# Patient Record
Sex: FEMALE | Race: WHITE
[De-identification: names, ages, dates, MRNs, and addresses within clinical notes are randomized per-mention and may not be internally consistent; named-entity substitution may affect disease eponyms.]

---

## 2017-04-17 ENCOUNTER — HOSPITAL ENCOUNTER (OUTPATIENT)
Dept: HOSPITAL 80 - BMCIMAGING | Age: 47
End: 2017-04-17
Attending: OBSTETRICS & GYNECOLOGY
Payer: COMMERCIAL

## 2017-04-17 DIAGNOSIS — Z12.31: Primary | ICD-10-CM

## 2017-04-17 DIAGNOSIS — D25.2: ICD-10-CM

## 2017-04-17 DIAGNOSIS — Z97.5: ICD-10-CM

## 2017-04-17 PROCEDURE — G0202 SCR MAMMO BI INCL CAD: HCPCS

## 2019-03-31 NOTE — GHP
[f rep st]



                                                       PREOP HISTORY AND PHYSICAL





DATE OF ADMISSION:  04/01/2019



History and physical for tomorrow, 04/01/2019. 



Kristie Good is a 48-year-old female who presents with left knee arthritis.  She has tricompartment oste
oarthritis, most severe in the lateral compartment which is bone-on-bone.  In summary, she has used o
ral antiinflammatory meds, she has done extensive physical therapy, she has had a steroid injection a
s well as viscosupplementation series.  She has used an  brace, she has had a knee arthroscop
y and chondroplasty and debridement.  Currently she has significant limitations of activities, she ha
s pain and swelling with weightbearing.  She has some limitation of motion and gait abnormality.  She
 has significant radiographic changes and this is confirmed with an MRI study as well.  She has bone-
to-bone in the lateral compartment and in the medial and patellofemoral compartments.  Has degenerati
ve changes as well.  She knows that she is relatively young for knee replacement, but she has really 
exhausted all other measures of management to the point where she needs a reconstructive procedure.  
She wishes to proceed with a left total knee arthroplasty.



PAST MEDICAL HISTORY:  Significant for no medical problems.  Orthopedically, she has had a left hip r
eplacement.  She has had a right shoulder rotator cuff repair.  She has had a right knee ACL reconstr
uction and a left knee arthroscopy.



ALLERGIES:  She has no known drug allergies.



MEDICATIONS:  She uses ibuprofen on a p.r.n. basis and Qsymia 3.75 mg-23 mg extended release.  She is
 a nonsmoker.



REVIEW OF SYSTEMS:  Negative for cardiopulmonary disease.



PHYSICAL EXAMINATION:  GENERAL:  Kristie Good is a well-developed, well-nourished female in no apparent 
distress.  HEAD AND NECK:  Normocephalic, atraumatic.  CHEST:  Clear.  CARDIOVASCULAR:  Regular rate 
and rhythm.  ABDOMEN:  Soft.  NEUROLOGIC:  She is alert and oriented x3.  MUSCULOSKELETAL:  Her left 
knee shows increased valgus alignment compared to the opposite side, and she lacks a couple of degree
s of full extension.  She has 120 degrees of flexion.  She has a small effusion and she has tendernes
s along the lateral patellar facet and the lateral joint line, ligamentously stable.



IMPRESSION:  Left knee osteoarthritis.



PLAN:  Left total knee arthroplasty.  The benefits and risks of surgery have been reviewed.  She unde
rstands that the risks include infection, damage to blood vessel or nerve, failure or loosening of co
mponents, blood clot in the leg or lungs, bleeding, and need for transfusion, and we have reviewed pa
in management options as well as the rigorous nature of the rehabilitation.  She has signed a consent
 form and wishes to proceed.  At a relatively young age for total knee replacement, she understands t
hat she has a higher risk for needing a revision of the joint in her lifetime.





Job #:  [id number]

## 2019-04-01 ENCOUNTER — HOSPITAL ENCOUNTER (OUTPATIENT)
Dept: HOSPITAL 80 - F3N | Age: 49
Setting detail: OBSERVATION
LOS: 1 days | Discharge: HOME | End: 2019-04-02
Attending: ORTHOPAEDIC SURGERY | Admitting: ORTHOPAEDIC SURGERY
Payer: COMMERCIAL

## 2019-04-01 DIAGNOSIS — M17.12: Primary | ICD-10-CM

## 2019-04-01 PROCEDURE — G0378 HOSPITAL OBSERVATION PER HR: HCPCS

## 2019-04-01 PROCEDURE — 73560 X-RAY EXAM OF KNEE 1 OR 2: CPT

## 2019-04-01 PROCEDURE — 97161 PT EVAL LOW COMPLEX 20 MIN: CPT

## 2019-04-01 PROCEDURE — 97530 THERAPEUTIC ACTIVITIES: CPT

## 2019-04-01 PROCEDURE — 97165 OT EVAL LOW COMPLEX 30 MIN: CPT

## 2019-04-01 PROCEDURE — 97116 GAIT TRAINING THERAPY: CPT

## 2019-04-01 PROCEDURE — 27447 TOTAL KNEE ARTHROPLASTY: CPT

## 2019-04-01 PROCEDURE — 97110 THERAPEUTIC EXERCISES: CPT

## 2019-04-01 PROCEDURE — C1713 ANCHOR/SCREW BN/BN,TIS/BN: HCPCS

## 2019-04-01 PROCEDURE — 0SRD0J9 REPLACEMENT OF LEFT KNEE JOINT WITH SYNTHETIC SUBSTITUTE, CEMENTED, OPEN APPROACH: ICD-10-PCS | Performed by: ORTHOPAEDIC SURGERY

## 2019-04-01 PROCEDURE — 97535 SELF CARE MNGMENT TRAINING: CPT

## 2019-04-01 RX ADMIN — DOCUSATE SODIUM AND SENNOSIDES SCH TAB: 50; 8.6 TABLET ORAL at 21:09

## 2019-04-01 RX ADMIN — ASPIRIN 81 MG SCH MG: 81 TABLET ORAL at 21:09

## 2019-04-01 RX ADMIN — Medication SCH MLS: at 18:26

## 2019-04-01 RX ADMIN — OXYCODONE HYDROCHLORIDE PRN MG: 15 TABLET ORAL at 18:25

## 2019-04-01 RX ADMIN — ACETAMINOPHEN SCH MG: 325 TABLET ORAL at 18:23

## 2019-04-01 RX ADMIN — FAMOTIDINE SCH MG: 20 TABLET, FILM COATED ORAL at 21:09

## 2019-04-01 NOTE — PDHPUP
History & Physical Update


H&P update statement: 


This history and physical update is based on an assessment of the patient which 

was completed after admission or registration (within 24 hours), but prior to 

the surgery/procedure.


no change


H&P update: H&P reviewed & patient examined (no change)

## 2019-04-01 NOTE — GOP
[f rep st]



                                                                OPERATIVE REPORT





DATE OF OPERATION:  



SURGEON:  Wei Eddy MD



ASSISTANT:  __________.  My surgical assistant was a medical necessity for this knee replacement, to 
provide leg positioning and soft tissue retraction.



[facility]  [facility]



ANESTHESIOLOGIST:  BECCA Martell DO



PREOPERATIVE DIAGNOSIS:  Left knee osteoarthritis.



POSTOPERATIVE DIAGNOSIS:  Left knee osteoarthritis.



PROCEDURE PERFORMED:  Left total knee arthroplasty.



FINDINGS:  



SPECIMENS:  Include excised bone.



ESTIMATED BLOOD LOSS:  Minimal.



INDICATIONS:  The patient is a 48-year-old female who has significant and severe left knee tricompart
ment osteoarthritis, most severely so, bone-to-bone in the lateral compartment.  As detailed in the h
istory and physical, she has tried a full spectrum of non-operative treatments and continues to have 
significant limitations and pain.  A left total knee arthroplasty is planned.



DESCRIPTION OF PROCEDURE:  The patient was taken to the operating room, and while seated on the OR ta
ble, Dr. Martell provided a spinal anesthetic.  She was then placed supine, received IV sedation,
 2 g of IV Ancef, and tranexamic acid.  A tourniquet was fit high on the left thigh.  Left leg was pr
epped and draped in the usual fashion.  The limb was elevated, exsanguinated, the tourniquet inflated
 to 275 mmHg.  I made a longitudinal incision in the midline, dissected through subcutaneous tissue, 
exposed the extensor mechanism and used a medial parapatellar arthrotomy.  The patella was inverted. 
 I measured its size and thickness.  I removed 9 mm of cartilage and bone, I sized the bone surface t
o a 32, drilled appropriate PEG holes and the component and the native patella reconstituted the appr
opriate patellar thickness.  The patella was inverted.  The knee was flexed.  I drilled a  hole 
in the distal femur.  I used an intramedullary alignment jig.  She was in quite a bit of valgus align
ment, probably a good 8-10 degrees.  For easier tissue balancing and to match her opposite knee.  I u
sed a 6-degree valgus cut, a +2 cut to accommodate her slight flexion contracture, and I sized the di
stal femur at a 4.  I applied a cutting block and made the appropriate anterior, posterior, and chamf
er cuts, and notch cuts to this bi-cruciate stabilized knee.  I used an extramedullary jig for the ti
jasen.  I adjusted the posterior slope and rotation, depth of cut, made my tibial cut, and sized the ti
bial surface at a 3.  I did trial reductions, marking the tibia and dialed in my rotation.  I finishe
d the tibial prep with a cruciate punch.  All the surfaces were jet lavaged with irrigation.  All com
ponents were cemented, femur first, then the tibia, then patella.  Femur is a size 4, tibia size 3, p
atella 32.  I did trial reductions.  I found a 9 mm articular insert allowed full extension, appropri
ate stability, excellent rollback in flexion.  The patella tracked appropriately.  I irrigated the terrence
int with antibiotic solution, including Betadine rinse.  I closed the arthrotomy with interrupted fig
ure-of-eight sutures of 0 Mersilene, subcutaneous tissue with 3-0 Monocryl, and the skin with a runni
ng Quill suture in the subcuticular space.  I used tissue glue, Steri-Strips, 4x4s, sterile Webril, T
ED stocking, and an Ace wrap to finish the dressing.  Tourniquet time was 70 minutes.



DRAINS:  None.



COUNTS:  All counts were correct.



DISPOSITION:  The patient was taken in stable condition to recovery.



SUMMARY OF COMPONENTS:  This is a [employer] knee, bi-cruciate stabilized Oxinium femur
, cross-linked polyethylene.  All components cemented, femur size 4, tibia size 3, patella 32, articu
lar tray 9 mm.





Job #:  [id number]

## 2019-04-01 NOTE — PDANEPAE
ANE Past Medical History





- Cardiovascular History


Hx Hypertension: No


Hx Arrhythmias: No


Hx Chest Pain: No


Hx Coronary Artery / Peripheral Vascular Disease: No


Hx CHF / Valvular Disease: No


Hx Palpitations: No





- Pulmonary History


Hx COPD: No


Hx Asthma/Reactive Airway Disease: No


Hx Recent Upper Respiratory Infection: No


Hx Oxygen in Use at Home: No


Hx Sleep Apnea: No


Sleep Apnea Screening Result - Last Documented: Negative





- Neurologic History


Hx Cerebrovascular Accident: No


Hx Seizures: No


Hx Dementia: No





- Endocrine History


Hx Diabetes: No


Hypothyroid: No


Hyperthyroid: No


Obesity: no





- Renal History


Hx Renal Disorders: No





- Liver History


Hx Hepatic Disorders: No





- Neurological & Psychiatric Hx


Hx Neurological and Psychiatric Disorders: No





- Cancer History


Hx Cancer: No





- Congenital Disorder History


Hx Congenital Disorders: No





- GI History


GERD: no


Hx Gastrointestinal Disorders: No





- Other Health History


Other Health History: NONE





- Chronic Pain History


Chronic Pain: Yes (RIGHT HIP)





- Surgical History


Prior Surgeries: KNEE SCOPE ACL RECONSTRUCTION.  ROTOCUFF RECONSTRUCTION.  L [facility]





ANE Review of Systems


Review of Systems: 








- Exercise capacity


Exercise capacity: >=4 METS, limited by disability


METS (RN): 5 METS





ANE Patient History





- Allergies


Allergies/Adverse Reactions: 








No Known Allergies Allergy (Verified 03/11/19 11:49)


 








- Home Medications


Home Medications: 








Glucosamine/Chondroitin [Glucosamine/Chondroitin (*)] 1 each PO DAILY 03/11/19 [

Last Taken 03/25/19]


Herbals/Supplements -Info Only 1 ea PO DAILY 03/11/19 [Last Taken 03/25/19]


Ibuprofen [Motrin (*)] 200 mg PO DAILY PRN 03/11/19 [Last Taken 03/25/19]


Omega-3 Fatty Acids [Fish Oil 1000 mg (*)] 1,000 mg PO DAILY 03/11/19 [Last 

Taken 03/31/19]


Phentermine/Topiramate [Qsymia 3.75 mg-23 mg Capsule] 1 each PO DAILY 03/11/19 [

Last Taken 03/27/19]








- NPO status


NPO Since - Liquids (Date): 04/01/19


NPO Since - Liquids (Time): 00:01


NPO Since - Solids (Date): 04/01/19


NPO Since - Solids (Time): 00:01





- Anes Hx


Anes Hx: no prior problems





- Smoking Hx


Smoking Status: Never smoked





- Alcohol Use


Alcohol Use: Occasionally





- Family Anes Hx


Family Anes Hx: neg - N/A


Family Hx Anesthesia Complications: NONE





ANE Labs/Vital Signs





- Vital Signs


Blood Pressure: 124/68


Heart Rate: 52


Respiratory Rate: 16


O2 Sat (%): 96


Height: 157.48 cm


Weight: 68.039 kg





ANE Physical Exam





- Airway


Neck exam: FROM


Mallampati Score: Class 2


Mouth exam: normal dental/mouth exam





- Pulmonary


Pulmonary: no respiratory distress, no rales or rhonchi, clear to auscultation





- Cardiovascular


Cardiovascular: regular rate and rhythym, no murmur, rub, or gallop





- ASA Status


ASA Status: II





ANE Anesthesia Plan


Anesthesia Plan: MAC, spinal


Regional Anesthesia: single shot NB, adductor canal FNB


Total IV Anesthesia: No

## 2019-04-02 VITALS — DIASTOLIC BLOOD PRESSURE: 59 MMHG | SYSTOLIC BLOOD PRESSURE: 96 MMHG

## 2019-04-02 RX ADMIN — ACETAMINOPHEN SCH MG: 325 TABLET ORAL at 01:21

## 2019-04-02 RX ADMIN — Medication SCH MLS: at 02:16

## 2019-04-02 RX ADMIN — ACETAMINOPHEN SCH MG: 325 TABLET ORAL at 13:15

## 2019-04-02 RX ADMIN — FAMOTIDINE SCH MG: 20 TABLET, FILM COATED ORAL at 08:29

## 2019-04-02 RX ADMIN — OXYCODONE HYDROCHLORIDE PRN MG: 15 TABLET ORAL at 06:24

## 2019-04-02 RX ADMIN — OXYCODONE HYDROCHLORIDE PRN MG: 15 TABLET ORAL at 01:22

## 2019-04-02 RX ADMIN — DOCUSATE SODIUM AND SENNOSIDES SCH TAB: 50; 8.6 TABLET ORAL at 08:29

## 2019-04-02 RX ADMIN — ASPIRIN 81 MG SCH MG: 81 TABLET ORAL at 08:29

## 2019-04-02 RX ADMIN — OXYCODONE HYDROCHLORIDE PRN MG: 15 TABLET ORAL at 13:15

## 2019-04-02 RX ADMIN — ACETAMINOPHEN SCH MG: 325 TABLET ORAL at 06:23

## 2019-04-02 NOTE — ASMTCMCOM
CM Note

 

CM Note                       

Notes:

Pt had planned knee surgery, resides with spouse. PT rec outpatient. No CM d/c needs identified. 

 

Date Signed:  04/02/2019 01:57 PM

Electronically Signed By:CARLOS A Chavez

## 2019-04-02 NOTE — ASMTLACE
LACE

 

Length of stay for            Answers:  2 days                                

current admission                                                             

Acuity / Level of             Answers:  No                                    

Care: Did the patient                                                         

have an inpatient                                                             

admission?                                                                    

Comorbidities - select        Answers:  Opioid dependence                     

all that apply                          / Chronic pain                        

# of Emergency department     Answers:  0                                     

visits in the last 6                                                          

months                                                                        

Score: 6

 

Date Signed:  04/02/2019 01:56 PM

Electronically Signed By:CARLOS A Chavez

## 2019-04-02 NOTE — SOAPPROG
SOAP Progress Note


Assessment/Plan: 


Assessment:





4/2/19 POD#1 L TKA pain controlled, xray fine




















Plan:





04/02/19 08:03


PT and home, oxy, tyl, celebrex asa


Objective: 





 Vital Signs











Temp Pulse Resp BP Pulse Ox


 


 36.9 C   55 L  16   93/58 L  96 


 


 04/02/19 04:00  04/02/19 04:00  04/02/19 04:00  04/02/19 06:30  04/02/19 04:00








 Laboratory Results





 04/02/19 04:35 





 











 04/01/19 04/02/19 04/03/19





 05:59 05:59 05:59


 


Intake Total  2905 


 


Output Total  3325 


 


Balance  -420 














ICD10 Worksheet


Patient Problems: 


 Problems











Problem Status Onset


 


Osteoarthritis of left knee Acute  














- ICD10 Problem Qualifiers


(1) Osteoarthritis of left knee